# Patient Record
Sex: MALE | Race: BLACK OR AFRICAN AMERICAN | NOT HISPANIC OR LATINO | URBAN - METROPOLITAN AREA
[De-identification: names, ages, dates, MRNs, and addresses within clinical notes are randomized per-mention and may not be internally consistent; named-entity substitution may affect disease eponyms.]

---

## 2023-11-01 PROBLEM — D49.6 BRAIN TUMOR (MULTI): Status: ACTIVE | Noted: 2023-11-01

## 2023-11-01 PROBLEM — R42 VERTIGO: Status: ACTIVE | Noted: 2023-11-01

## 2023-11-01 PROBLEM — M54.50 LOWER BACK PAIN: Status: ACTIVE | Noted: 2023-11-01

## 2023-11-01 PROBLEM — G44.209 TENSION HEADACHE: Status: ACTIVE | Noted: 2023-11-01

## 2023-11-01 PROBLEM — R20.0 LEFT SIDED NUMBNESS: Status: ACTIVE | Noted: 2023-11-01

## 2023-11-01 PROBLEM — R63.4 WEIGHT LOSS: Status: ACTIVE | Noted: 2023-11-01

## 2023-11-01 RX ORDER — AMITRIPTYLINE HYDROCHLORIDE 10 MG/1
1 TABLET, FILM COATED ORAL NIGHTLY
COMMUNITY
Start: 2019-08-05

## 2023-11-01 RX ORDER — IBUPROFEN 600 MG/1
TABLET ORAL
COMMUNITY

## 2023-11-01 RX ORDER — CHOLECALCIFEROL (VITAMIN D3) 25 MCG
TABLET ORAL
COMMUNITY

## 2023-11-01 RX ORDER — VITAMIN B COMPLEX
CAPSULE ORAL
COMMUNITY

## 2023-11-01 RX ORDER — BIOTIN 10 MG
TABLET ORAL
COMMUNITY

## 2023-11-02 ENCOUNTER — OFFICE VISIT (OUTPATIENT)
Dept: PODIATRY | Facility: CLINIC | Age: 33
End: 2023-11-02
Payer: COMMERCIAL

## 2023-11-02 ENCOUNTER — APPOINTMENT (OUTPATIENT)
Dept: LAB | Facility: LAB | Age: 33
End: 2023-11-02
Payer: COMMERCIAL

## 2023-11-02 DIAGNOSIS — L60.0 INGROWN TOENAIL: ICD-10-CM

## 2023-11-02 DIAGNOSIS — D49.9 NEOPLASM: Primary | ICD-10-CM

## 2023-11-02 DIAGNOSIS — D49.9 NEOPLASM OF UNSPECIFIED BEHAVIOR OF UNSPECIFIED SITE: Primary | ICD-10-CM

## 2023-11-02 PROCEDURE — 0752T DGTZ GLS MCRSCP SLD LVL III: CPT

## 2023-11-02 PROCEDURE — 99203 OFFICE O/P NEW LOW 30 MIN: CPT | Performed by: PODIATRIST

## 2023-11-02 PROCEDURE — 20600 DRAIN/INJ JOINT/BURSA W/O US: CPT | Performed by: PODIATRIST

## 2023-11-02 PROCEDURE — 88304 TISSUE EXAM BY PATHOLOGIST: CPT

## 2023-11-02 PROCEDURE — 11750 EXCISION NAIL&NAIL MATRIX: CPT | Performed by: PODIATRIST

## 2023-11-02 RX ORDER — LIDOCAINE HYDROCHLORIDE 10 MG/ML
5 INJECTION INFILTRATION; PERINEURAL
Status: COMPLETED | OUTPATIENT
Start: 2023-11-02 | End: 2023-11-02

## 2023-11-02 RX ORDER — CEPHALEXIN 500 MG/1
500 CAPSULE ORAL 4 TIMES DAILY
Qty: 24 CAPSULE | Refills: 0 | Status: SHIPPED | OUTPATIENT
Start: 2023-11-02 | End: 2023-11-08

## 2023-11-02 RX ADMIN — TRIAMCINOLONE ACETONIDE 10 MG: 40 INJECTION, SUSPENSION INTRA-ARTICULAR; INTRAMUSCULAR at 10:38

## 2023-11-02 RX ADMIN — LIDOCAINE HYDROCHLORIDE 5 ML: 10 INJECTION INFILTRATION; PERINEURAL at 10:38

## 2023-11-02 NOTE — PROGRESS NOTES
History Of Present Illness  Evens Lucas is a 33 y.o. male presenting with chief complaint:  Transition of care    Patient complains of ingrown of the left great toe. 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    Surgical History  He has a past surgical history that includes Other surgical history (12/06/2021) and Other surgical history (05/07/2019).     Social History  He has no history on file for tobacco use, alcohol use, and drug use.    Family History  Family History   Problem Relation Name Age of Onset    No Known Problems Mother      No Known Problems Father      Other (cardiac disorder) Other g/m         Allergies  Patient has no known allergies.    Medications  Current Outpatient Medications   Medication Sig Dispense Refill    amitriptyline (Elavil) 10 mg tablet Take 1 tablet (10 mg) by mouth once daily at bedtime. Take per directed 1 tab at bedtime for one week thereafter,take 2 tablets at  bedtime daily      b complex vitamins (Vitamins B Complex) capsule Take by mouth. Take per directed      biotin 10 mg tablet Take by mouth. Take per directed      cephalexin (Keflex) 500 mg capsule Take 1 capsule (500 mg) by mouth 4 times a day for 6 days. 24 capsule 0    cholecalciferol (Vitamin D-3) 25 MCG (1000 UT) tablet Take by mouth. Take per directed      ibuprofen 600 mg tablet Take by mouth. Take per directed  prn       No current facility-administered medications for this visit.       Review of Systems    REVIEW OF SYSTEMS  GENERAL:  Negative for malaise, significant weight loss, fever  CARDIOVASCULAR: leg swelling   MUSCULOSKELETAL:  Negative for joint pain or swelling, back pain, and muscle pain.  SKIN:  Negative for lesions, rash, and itching  PSYCH:  Negative for sleep disturbance, mood disorder and recent psychosocial stressors  NEURO: Negative, denies any burning, tingling or numbness      Objective:   Vasc: DP and PT pulses are palpable bilateral.  CFT is less than 3 seconds bilateral.  Skin temperature is warm to cool proximal to distal bilateral.      Neuro:  Light touch is intact to the foot bilateral.  Protective sensation is intact to the foot when tested with the 5.07 SWM bilateral.  There is no clonus noted.  The hallux is downgoing bilateral.      Derm: Nails are normal. Skin is supple with normal texture and turgor noted.  Webspaces are clean, dry and intact bilateral.  There are no hyperkeratoses, ulcerations, verruca or other lesions noted.  Ingrown toenail left first nail, he has build up under nail fold lateral    Ortho: Muscle strength is 5/5 for all pedal groups tested.      Assessment/Plan     Diagnoses and all orders for this visit:  Neoplasm  -     Surgical Pathology Exam  -     S Inj/Asp: L great MTP  Ingrown toenail  -     cephalexin (Keflex) 500 mg capsule; Take 1 capsule (500 mg) by mouth 4 times a day for 6 days.  -     S Inj/Asp: L great MTP                   Patient ID: Evens Lucas is a 33 y.o. male.    S Inj/Asp: L great MTP on 11/2/2023 10:38 AM  Indications: pain  Details: 25 G needle, dorsal approach  Medications: 5 mL lidocaine 10 mg/mL (1 %); 10 mg triamcinolone acetonide 40 mg/mL  Outcome: tolerated well, no immediate complications    Patient has consented to a permanent partial nail avulsion both verbally and written. Patient understands that the toenail may grow back and could appear discolored, thickened, or with a fungal infection. Patient appears to understand and is in agreement with the plan.  All questions were answered to the patient's satisfaction with no guarantees given or implied.      Nail Removal Informed Consent included the following and was signed by patient or patients guardian:   I understand that this procedure involves injection of local anesthesia, and I have no known allergies to local anesthetics.  My physician and I have discussed risks,  benefits, and potential complications of this procedure.  Risks include, but are not limited to: persistent bleeding, pain, prolonged healing, infection, allergic reaction, swelling, numbness/tingling, and recurrence.  Alternative treatment options were also discussed.  All questions have been answered to my satisfaction and no guarantees regarding the outcome of the procedure have been given or implied.  Aftercare requirements have been discussed and I intend to comply with recommendation.      Procedure: Partial Temporary Nail Avulsion - Lateral Border of left first Toe    The digit was anesthetized with 3cc of 2% lidocaine plain utilizing a digit block. The area was sterily prepped and draped. The toe was cleansed with betadine. Digit tourniqauet applied. A spatula was used to separate the nail plate from the nail bed. An english anvil was used to separate the nail plate in a longitudinal fashion. The nail border was removed with hemostats. A curette was used to ensure no spicules remained.     Three separate applications of phenol were applied to the matrix cells for 30 seconds each. It was ensured that the skin was protected from the chemical. The area was rinsed thoroughly with alcohol to neutralize the acid.   There was a small cyst like tissue that was excised from prox nail fold. It was sent to pathology for evaluation   Digit tourniquet removed. A curette was used again to ensure no spicules remained.  Appropriate capillary refill time was confirmed.  The toe was then dressed with antibiotic ointment and a dry sterile dressing.  Patient was given extensive verbal and written homegoing instructions.      Homegoing instructions dispensed included:   The injection that you received prior to the procedure will have local anesthetic effects for the next 3-5 hours.  Following this, you may notice that the toe is sore with pressure.  Therefore, during healing, attempt to wear an open-toe or wider-toebox  shoe.  Leave the bandage on your toe for the next 24 hours if possible.  Tomorrow, begin soaking the affected foot in a warm water bath, with or without Epsom salts.  Make sure to dry the toe completely before applying a dressing.   Change bandage on a daily basis beginning tomorrow following soaks.  Apply a small amount of topical antibiotic ointment with each dressing change.    Keep bandages on while you shower, and change bandage once you dry off.  Keep bandages on when you are wearing shoes and socks.  If a scab forms at the nail removal site, leave it in place.  Continue with daily soaking and bandage changes until you return in two weeks for your follow up appointment.  Call your physician immediately if you notice any increased redness, warmth, milky-appearing discharge, or other signs of infection at the nail removal site.  Call your physician immediately if you experience significant bleeding at the nail removal site.    Patient was advised to call the office or emergency advice line should they experience any problems, changes or worsening of symptoms, or have any questions. Alternatively, the patient was advised to go to the ED.    Pt instructed to follow up in 2-3 weeks or sooner if necessary.     Immediately prior to procedure a time out was called to verify the correct patient, procedure, equipment, support staff and site/side marked as required. Patient was prepped and draped in the usual sterile fashion.       Denise Mora, OWEN  [address] [place] [address]

## 2023-11-02 NOTE — PATIENT INSTRUCTIONS
Instructions after nail surgery:  1.  You have just had a surgical procedure to correct a deformed or infected ingrown toenail.  2.  You will experience a little pain.  Any pain that you have easily be controlled with Tylenol or something comparable.  3.  If you see blood coming through the bandage, do not be alarmed, this is normal.  4.  The day following your procedure, remove the bandage and soak your foot in either warm water and Epsom salts or warm water and antibacterial soap.  5.  Your toe will appear infected for approximately 10 days.  It will be red and it will drain.  This is due to the chemical that was applied during the procedure..  6.  Repeat soaking for 10 days postoperatively.  After soaking, apply triple antibiotic and a Band-Aid to the toe.  7.  A follow-up appointment is not necessary if the toe heals uneventfully.  If you are worried, please call and make a follow-up appointment.

## 2023-11-06 RX ORDER — TRIAMCINOLONE ACETONIDE 40 MG/ML
10 INJECTION, SUSPENSION INTRA-ARTICULAR; INTRAMUSCULAR
Status: COMPLETED | OUTPATIENT
Start: 2023-11-02 | End: 2023-11-02

## 2023-11-13 LAB
LABORATORY COMMENT REPORT: NORMAL
PATH REPORT.FINAL DX SPEC: NORMAL
PATH REPORT.GROSS SPEC: NORMAL
PATH REPORT.RELEVANT HX SPEC: NORMAL
PATH REPORT.TOTAL CANCER: NORMAL

## 2024-01-08 NOTE — PROGRESS NOTES
General: Skin is warm and dry.      Coloration: Skin is not pale.      Findings: No erythema or rash.   Neurological:      Mental Status: He is alert and oriented to person, place, and time.      Motor: No abnormal muscle tone.   Psychiatric:         Thought Content: Thought content normal.         Judgment: Judgment normal.         Assessment:       Diagnosis Orders   1. History of benign brain tumor        2. Hyperglycemia  Comprehensive Metabolic Panel    CBC with Auto Differential    Hemoglobin A1C      3. Hyperlipidemia, unspecified hyperlipidemia type  Lipid, Fasting           No results found for: \"LIPIDPAN\", \"BMP\", \"CMP\", \"CBC\", \"CBCAUTODIF\"  Plan:      Ahmet was seen today for new patient.    Diagnoses and all orders for this visit:    History of benign brain tumor    Hyperglycemia  -     Comprehensive Metabolic Panel; Future  -     CBC with Auto Differential; Future  -     Hemoglobin A1C; Future    Hyperlipidemia, unspecified hyperlipidemia type  -     Lipid, Fasting; Future         Return in about 9 months (around 10/9/2024).      On this date 01/09/24 I have spent 30 minutes reviewing previous notes, test results and face to face with the patient discussing the diagnosis and importance of compliance with the treatment plan.     Iam Barbosa MD    Please note, this report has been partially produced using speech recognition software  and may cause  and /or contain errors related to that system including grammar, punctuation and spelling as well as words and phrases that may seem inappropriate. If there are questions or concerns please feel free to contact me to clarify.

## 2024-01-09 ENCOUNTER — OFFICE VISIT (OUTPATIENT)
Dept: FAMILY MEDICINE CLINIC | Age: 34
End: 2024-01-09
Payer: COMMERCIAL

## 2024-01-09 VITALS
RESPIRATION RATE: 16 BRPM | BODY MASS INDEX: 38.45 KG/M2 | HEART RATE: 85 BPM | WEIGHT: 245 LBS | HEIGHT: 67 IN | DIASTOLIC BLOOD PRESSURE: 80 MMHG | OXYGEN SATURATION: 96 % | SYSTOLIC BLOOD PRESSURE: 110 MMHG

## 2024-01-09 DIAGNOSIS — Z86.011 HISTORY OF BENIGN BRAIN TUMOR: Primary | ICD-10-CM

## 2024-01-09 DIAGNOSIS — E78.5 HYPERLIPIDEMIA, UNSPECIFIED HYPERLIPIDEMIA TYPE: ICD-10-CM

## 2024-01-09 DIAGNOSIS — R73.9 HYPERGLYCEMIA: ICD-10-CM

## 2024-01-09 LAB
ALBUMIN SERPL-MCNC: 4.8 G/DL (ref 3.5–4.6)
ALP SERPL-CCNC: 100 U/L (ref 35–104)
ALT SERPL-CCNC: 56 U/L (ref 0–41)
ANION GAP SERPL CALCULATED.3IONS-SCNC: 17 MEQ/L (ref 9–15)
AST SERPL-CCNC: 32 U/L (ref 0–40)
BASOPHILS # BLD: 0.1 K/UL (ref 0–0.2)
BASOPHILS NFR BLD: 1.1 %
BILIRUB SERPL-MCNC: 0.7 MG/DL (ref 0.2–0.7)
BUN SERPL-MCNC: 7 MG/DL (ref 6–20)
CALCIUM SERPL-MCNC: 9.7 MG/DL (ref 8.5–9.9)
CHLORIDE SERPL-SCNC: 103 MEQ/L (ref 95–107)
CHOLEST SERPL-MCNC: 196 MG/DL (ref 0–199)
CO2 SERPL-SCNC: 22 MEQ/L (ref 20–31)
CREAT SERPL-MCNC: 0.98 MG/DL (ref 0.7–1.2)
EOSINOPHIL # BLD: 0.1 K/UL (ref 0–0.7)
EOSINOPHIL NFR BLD: 1.5 %
ERYTHROCYTE [DISTWIDTH] IN BLOOD BY AUTOMATED COUNT: 13.6 % (ref 11.5–14.5)
GLOBULIN SER CALC-MCNC: 3.4 G/DL (ref 2.3–3.5)
GLUCOSE SERPL-MCNC: 124 MG/DL (ref 70–99)
HBA1C MFR BLD: 5.8 % (ref 4.8–5.9)
HCT VFR BLD AUTO: 45.3 % (ref 42–52)
HDLC SERPL-MCNC: 34 MG/DL (ref 40–59)
HGB BLD-MCNC: 16 G/DL (ref 14–18)
LDL CHOLESTEROL CALCULATED: 136 MG/DL (ref 0–129)
LYMPHOCYTES # BLD: 2.3 K/UL (ref 1–4.8)
LYMPHOCYTES NFR BLD: 43 %
MCH RBC QN AUTO: 29.1 PG (ref 27–31.3)
MCHC RBC AUTO-ENTMCNC: 35.3 % (ref 33–37)
MCV RBC AUTO: 82.4 FL (ref 79–92.2)
MONOCYTES # BLD: 0.3 K/UL (ref 0.2–0.8)
MONOCYTES NFR BLD: 5.6 %
NEUTROPHILS # BLD: 2.6 K/UL (ref 1.4–6.5)
NEUTS SEG NFR BLD: 48.2 %
PLATELET # BLD AUTO: 280 K/UL (ref 130–400)
POTASSIUM SERPL-SCNC: 4 MEQ/L (ref 3.4–4.9)
PROT SERPL-MCNC: 8.2 G/DL (ref 6.3–8)
RBC # BLD AUTO: 5.5 M/UL (ref 4.7–6.1)
SODIUM SERPL-SCNC: 142 MEQ/L (ref 135–144)
TRIGLYCERIDE, FASTING: 128 MG/DL (ref 0–150)
WBC # BLD AUTO: 5.3 K/UL (ref 4.8–10.8)

## 2024-01-09 PROCEDURE — 99214 OFFICE O/P EST MOD 30 MIN: CPT | Performed by: INTERNAL MEDICINE

## 2024-01-09 PROCEDURE — G8427 DOCREV CUR MEDS BY ELIG CLIN: HCPCS | Performed by: INTERNAL MEDICINE

## 2024-01-09 PROCEDURE — G8484 FLU IMMUNIZE NO ADMIN: HCPCS | Performed by: INTERNAL MEDICINE

## 2024-01-09 PROCEDURE — 4004F PT TOBACCO SCREEN RCVD TLK: CPT | Performed by: INTERNAL MEDICINE

## 2024-01-09 PROCEDURE — G8417 CALC BMI ABV UP PARAM F/U: HCPCS | Performed by: INTERNAL MEDICINE

## 2024-01-09 SDOH — ECONOMIC STABILITY: HOUSING INSECURITY
IN THE LAST 12 MONTHS, WAS THERE A TIME WHEN YOU DID NOT HAVE A STEADY PLACE TO SLEEP OR SLEPT IN A SHELTER (INCLUDING NOW)?: NO

## 2024-01-09 SDOH — ECONOMIC STABILITY: FOOD INSECURITY: WITHIN THE PAST 12 MONTHS, YOU WORRIED THAT YOUR FOOD WOULD RUN OUT BEFORE YOU GOT MONEY TO BUY MORE.: NEVER TRUE

## 2024-01-09 SDOH — ECONOMIC STABILITY: FOOD INSECURITY: WITHIN THE PAST 12 MONTHS, THE FOOD YOU BOUGHT JUST DIDN'T LAST AND YOU DIDN'T HAVE MONEY TO GET MORE.: NEVER TRUE

## 2024-01-09 SDOH — ECONOMIC STABILITY: INCOME INSECURITY: HOW HARD IS IT FOR YOU TO PAY FOR THE VERY BASICS LIKE FOOD, HOUSING, MEDICAL CARE, AND HEATING?: NOT HARD AT ALL

## 2024-01-09 ASSESSMENT — ENCOUNTER SYMPTOMS
FACIAL SWELLING: 0
COLOR CHANGE: 0
BACK PAIN: 0
ABDOMINAL DISTENTION: 0
EYE DISCHARGE: 0
APNEA: 0
EYE REDNESS: 0
SINUS PAIN: 0
RHINORRHEA: 0
DIARRHEA: 0
ABDOMINAL PAIN: 0
COUGH: 0
NAUSEA: 0
RECTAL PAIN: 0
TROUBLE SWALLOWING: 0
SORE THROAT: 0
WHEEZING: 0
SINUS PRESSURE: 0
SHORTNESS OF BREATH: 0
EYE PAIN: 0
PHOTOPHOBIA: 0
EYE ITCHING: 0
CONSTIPATION: 0
BLOOD IN STOOL: 0
VOMITING: 0
CHEST TIGHTNESS: 0
VOICE CHANGE: 0

## 2024-01-09 ASSESSMENT — PATIENT HEALTH QUESTIONNAIRE - PHQ9
SUM OF ALL RESPONSES TO PHQ QUESTIONS 1-9: 0
SUM OF ALL RESPONSES TO PHQ QUESTIONS 1-9: 0
2. FEELING DOWN, DEPRESSED OR HOPELESS: 0
1. LITTLE INTEREST OR PLEASURE IN DOING THINGS: 0
SUM OF ALL RESPONSES TO PHQ QUESTIONS 1-9: 0
SUM OF ALL RESPONSES TO PHQ9 QUESTIONS 1 & 2: 0
SUM OF ALL RESPONSES TO PHQ QUESTIONS 1-9: 0

## 2024-10-08 ASSESSMENT — ENCOUNTER SYMPTOMS
EYE DISCHARGE: 0
CONSTIPATION: 0
APNEA: 0
EYE PAIN: 0
SINUS PAIN: 0
EYE REDNESS: 0
SINUS PRESSURE: 0
CHEST TIGHTNESS: 0
RHINORRHEA: 0
WHEEZING: 0
PHOTOPHOBIA: 0
RECTAL PAIN: 0
BLOOD IN STOOL: 0
ABDOMINAL DISTENTION: 0
SORE THROAT: 0
VOICE CHANGE: 0
COLOR CHANGE: 0
ABDOMINAL PAIN: 0
BACK PAIN: 0
EYE ITCHING: 0
COUGH: 0
TROUBLE SWALLOWING: 0
DIARRHEA: 0
FACIAL SWELLING: 0
VOMITING: 0
NAUSEA: 0
SHORTNESS OF BREATH: 0

## 2024-10-08 NOTE — PROGRESS NOTES
Subjective:      Patient ID: Ahmet Savage is a 34 y.o. male New patient, here for evaluation of the following chief complaint(s):  Chief Complaint   Patient presents with    Annual Exam       HPI  33-year-old male with a history of hyperglycemia , hyperlipidemia and  benign brain tumor presents for a fu visit.        Hyperlipidemia: The patient's most recent cholesterol profile is noted below:      Component      Latest Ref Rng 1/9/2024   Cholesterol, Fasting      0 - 199 mg/dL 196    Triglyceride, Fasting      0 - 150 mg/dL 128    HDL Cholesterol      40 - 59 mg/dL 34 (L)    LDL Cholesterol      0 - 129 mg/dL 136 (H)           Hyperglycemia: An assessment the patient's basic metabolic panel on March 25, 2019 revealed a glucose of 120.      Shx:  T:+ cigars  A:+ social   D:-A1c  Role Model : Grandma:Make positive impact and pursue your dream as life is short. Love one another.           Fhx:   4 daughter:   Rito-12 yr old  Tiffany-3 years old  Liv-15 months  Willow \"Ingris\"-3 weeks            MGM:DMII  PGM:lung CA  Mother: Healthy  Father:Healty  Sister:  Brother        Brain Surgery: in 2000    At present he denies polyuria,  Polydipsia, constitutional, sinus, visual, cardiopulmonary, urologic, gastrointestinal, immunologic/hematologic, musculoskeletal, neurologic,dermatologic, or psychiatric complaints.    Current Outpatient Medications on File Prior to Visit   Medication Sig Dispense Refill    SUMAtriptan (IMITREX) 50 MG tablet Take 1 tablet by mouth 2 times daily as needed for Migraine for up to 10 doses. 20 tablet 0     No current facility-administered medications on file prior to visit.      Patient has no known allergies.  Past Medical History:   Diagnosis Date    History of benign brain tumor      Past Surgical History:   Procedure Laterality Date    BRAIN SURGERY       Social History     Socioeconomic History    Marital status: Single     Spouse name: Not on file    Number of children: Not on file    Years of

## 2024-10-09 ENCOUNTER — OFFICE VISIT (OUTPATIENT)
Dept: FAMILY MEDICINE CLINIC | Age: 34
End: 2024-10-09
Payer: COMMERCIAL

## 2024-10-09 VITALS
RESPIRATION RATE: 16 BRPM | SYSTOLIC BLOOD PRESSURE: 110 MMHG | WEIGHT: 263 LBS | HEIGHT: 67 IN | OXYGEN SATURATION: 96 % | DIASTOLIC BLOOD PRESSURE: 76 MMHG | BODY MASS INDEX: 41.28 KG/M2 | HEART RATE: 83 BPM

## 2024-10-09 DIAGNOSIS — E78.5 HYPERLIPIDEMIA, UNSPECIFIED HYPERLIPIDEMIA TYPE: ICD-10-CM

## 2024-10-09 DIAGNOSIS — R73.9 HYPERGLYCEMIA: Primary | ICD-10-CM

## 2024-10-09 PROCEDURE — G8427 DOCREV CUR MEDS BY ELIG CLIN: HCPCS | Performed by: INTERNAL MEDICINE

## 2024-10-09 PROCEDURE — 99214 OFFICE O/P EST MOD 30 MIN: CPT | Performed by: INTERNAL MEDICINE

## 2024-10-09 PROCEDURE — 4004F PT TOBACCO SCREEN RCVD TLK: CPT | Performed by: INTERNAL MEDICINE

## 2024-10-09 PROCEDURE — G8417 CALC BMI ABV UP PARAM F/U: HCPCS | Performed by: INTERNAL MEDICINE

## 2024-10-09 PROCEDURE — G8484 FLU IMMUNIZE NO ADMIN: HCPCS | Performed by: INTERNAL MEDICINE

## 2025-01-13 ENCOUNTER — OFFICE VISIT (OUTPATIENT)
Age: 35
End: 2025-01-13
Payer: COMMERCIAL

## 2025-01-13 VITALS
BODY MASS INDEX: 41.28 KG/M2 | DIASTOLIC BLOOD PRESSURE: 80 MMHG | OXYGEN SATURATION: 95 % | WEIGHT: 263 LBS | RESPIRATION RATE: 16 BRPM | HEART RATE: 85 BPM | SYSTOLIC BLOOD PRESSURE: 112 MMHG | HEIGHT: 67 IN

## 2025-01-13 DIAGNOSIS — E78.5 HYPERLIPIDEMIA, UNSPECIFIED HYPERLIPIDEMIA TYPE: ICD-10-CM

## 2025-01-13 DIAGNOSIS — R73.9 HYPERGLYCEMIA: ICD-10-CM

## 2025-01-13 DIAGNOSIS — Z86.011 HISTORY OF BENIGN BRAIN TUMOR: ICD-10-CM

## 2025-01-13 DIAGNOSIS — R73.9 HYPERGLYCEMIA: Primary | ICD-10-CM

## 2025-01-13 LAB
ALBUMIN SERPL-MCNC: 4.6 G/DL (ref 3.5–4.6)
ALP SERPL-CCNC: 111 U/L (ref 35–104)
ALT SERPL-CCNC: 75 U/L (ref 0–41)
ANION GAP SERPL CALCULATED.3IONS-SCNC: 11 MEQ/L (ref 9–15)
AST SERPL-CCNC: 38 U/L (ref 0–40)
BASOPHILS # BLD: 0.1 K/UL (ref 0–0.2)
BASOPHILS NFR BLD: 1 %
BILIRUB SERPL-MCNC: 0.5 MG/DL (ref 0.2–0.7)
BUN SERPL-MCNC: 7 MG/DL (ref 6–20)
CALCIUM SERPL-MCNC: 9.6 MG/DL (ref 8.5–9.9)
CHLORIDE SERPL-SCNC: 102 MEQ/L (ref 95–107)
CHOLEST SERPL-MCNC: 178 MG/DL (ref 0–199)
CO2 SERPL-SCNC: 29 MEQ/L (ref 20–31)
CREAT SERPL-MCNC: 0.97 MG/DL (ref 0.7–1.2)
EOSINOPHIL # BLD: 0.1 K/UL (ref 0–0.7)
EOSINOPHIL NFR BLD: 1.6 %
ERYTHROCYTE [DISTWIDTH] IN BLOOD BY AUTOMATED COUNT: 13.1 % (ref 11.5–14.5)
GLOBULIN SER CALC-MCNC: 2.9 G/DL (ref 2.3–3.5)
GLUCOSE SERPL-MCNC: 124 MG/DL (ref 70–99)
HCT VFR BLD AUTO: 48.7 % (ref 42–52)
HDLC SERPL-MCNC: 27 MG/DL (ref 40–59)
HGB BLD-MCNC: 17 G/DL (ref 14–18)
LDL CHOLESTEROL: 90 MG/DL (ref 0–129)
LYMPHOCYTES # BLD: 3 K/UL (ref 1–4.8)
LYMPHOCYTES NFR BLD: 47.9 %
MCH RBC QN AUTO: 29.3 PG (ref 27–31.3)
MCHC RBC AUTO-ENTMCNC: 34.9 % (ref 33–37)
MCV RBC AUTO: 84 FL (ref 79–92.2)
MONOCYTES # BLD: 0.4 K/UL (ref 0.2–0.8)
MONOCYTES NFR BLD: 6.3 %
NEUTROPHILS # BLD: 2.6 K/UL (ref 1.4–6.5)
NEUTS SEG NFR BLD: 41.8 %
PLATELET # BLD AUTO: 301 K/UL (ref 130–400)
POTASSIUM SERPL-SCNC: 4.2 MEQ/L (ref 3.4–4.9)
PROT SERPL-MCNC: 7.5 G/DL (ref 6.3–8)
RBC # BLD AUTO: 5.8 M/UL (ref 4.7–6.1)
SODIUM SERPL-SCNC: 142 MEQ/L (ref 135–144)
TRIGLYCERIDE, FASTING: 305 MG/DL (ref 0–150)
WBC # BLD AUTO: 6.3 K/UL (ref 4.8–10.8)

## 2025-01-13 PROCEDURE — G8417 CALC BMI ABV UP PARAM F/U: HCPCS | Performed by: INTERNAL MEDICINE

## 2025-01-13 PROCEDURE — 99213 OFFICE O/P EST LOW 20 MIN: CPT | Performed by: INTERNAL MEDICINE

## 2025-01-13 PROCEDURE — 99214 OFFICE O/P EST MOD 30 MIN: CPT | Performed by: INTERNAL MEDICINE

## 2025-01-13 PROCEDURE — G8428 CUR MEDS NOT DOCUMENT: HCPCS | Performed by: INTERNAL MEDICINE

## 2025-01-13 PROCEDURE — 4004F PT TOBACCO SCREEN RCVD TLK: CPT | Performed by: INTERNAL MEDICINE

## 2025-01-13 SDOH — ECONOMIC STABILITY: FOOD INSECURITY: WITHIN THE PAST 12 MONTHS, YOU WORRIED THAT YOUR FOOD WOULD RUN OUT BEFORE YOU GOT MONEY TO BUY MORE.: NEVER TRUE

## 2025-01-13 SDOH — ECONOMIC STABILITY: FOOD INSECURITY: WITHIN THE PAST 12 MONTHS, THE FOOD YOU BOUGHT JUST DIDN'T LAST AND YOU DIDN'T HAVE MONEY TO GET MORE.: NEVER TRUE

## 2025-01-13 ASSESSMENT — ENCOUNTER SYMPTOMS
BACK PAIN: 0
SHORTNESS OF BREATH: 0
FACIAL SWELLING: 0
PHOTOPHOBIA: 0
NAUSEA: 0
BLOOD IN STOOL: 0
EYE ITCHING: 0
SINUS PRESSURE: 0
SORE THROAT: 0
EYE REDNESS: 0
EYE PAIN: 0
VOMITING: 0
ABDOMINAL PAIN: 0
RECTAL PAIN: 0
EYE DISCHARGE: 0
ABDOMINAL DISTENTION: 0
APNEA: 0
CONSTIPATION: 0
COLOR CHANGE: 0
VOICE CHANGE: 0
DIARRHEA: 0
COUGH: 0
WHEEZING: 0
RHINORRHEA: 0
TROUBLE SWALLOWING: 0
SINUS PAIN: 0
CHEST TIGHTNESS: 0

## 2025-01-13 ASSESSMENT — PATIENT HEALTH QUESTIONNAIRE - PHQ9
SUM OF ALL RESPONSES TO PHQ QUESTIONS 1-9: 0
1. LITTLE INTEREST OR PLEASURE IN DOING THINGS: NOT AT ALL
SUM OF ALL RESPONSES TO PHQ9 QUESTIONS 1 & 2: 0
SUM OF ALL RESPONSES TO PHQ QUESTIONS 1-9: 0
2. FEELING DOWN, DEPRESSED OR HOPELESS: NOT AT ALL
SUM OF ALL RESPONSES TO PHQ QUESTIONS 1-9: 0
SUM OF ALL RESPONSES TO PHQ QUESTIONS 1-9: 0

## 2025-01-13 NOTE — PROGRESS NOTES
Subjective:      Patient ID: Ahmet Savage is a 34 y.o. male New patient, here for evaluation of the following chief complaint(s):  Chief Complaint   Patient presents with    Follow-up    Cough     Pt states he has had a cough for 2 weeks and was seen at an urgent care       HPI  33-year-old male with a history of hyperglycemia , hyperlipidemia and  benign brain tumor presents for a fu visit.        Hyperlipidemia: The patient's most recent cholesterol profile is noted below:      Component      Latest Ref Rng 1/9/2024   Cholesterol, Fasting      0 - 199 mg/dL 196    Triglyceride, Fasting      0 - 150 mg/dL 128    HDL Cholesterol      40 - 59 mg/dL 34 (L)    LDL Cholesterol      0 - 129 mg/dL 136 (H)           Hyperglycemia: An assessment the patient's basic metabolic panel on March 25, 2019 revealed a glucose of 120.      Shx:  T:+ cigars  A:+ social   D:-A1c  Role Model : Grandma:Make positive impact and pursue your dream as life is short. Love one another.           Fhx:   4 daughter:   Rito-12 yr old  Tiffany-3 years old  Liv-15 months  Willow \"Ingris\"-3 weeks            MGM:DMII  PGM:lung CA  Mother: Healthy  Father:Healty  Sister:  Brother        Brain Surgery: in 2000    At present he denies polyuria,  Polydipsia, constitutional, sinus, visual, cardiopulmonary, urologic, gastrointestinal, immunologic/hematologic, musculoskeletal, neurologic,dermatologic, or psychiatric complaints.    Current Outpatient Medications on File Prior to Visit   Medication Sig Dispense Refill    SUMAtriptan (IMITREX) 50 MG tablet Take 1 tablet by mouth 2 times daily as needed for Migraine for up to 10 doses. 20 tablet 0     No current facility-administered medications on file prior to visit.      Patient has no known allergies.  Past Medical History:   Diagnosis Date    History of benign brain tumor      Past Surgical History:   Procedure Laterality Date    BRAIN SURGERY       Social History     Socioeconomic History    Marital status:

## 2025-01-14 LAB
ESTIMATED AVERAGE GLUCOSE: 114 MG/DL
HBA1C MFR BLD: 5.6 % (ref 4–6)

## 2025-04-15 ENCOUNTER — OFFICE VISIT (OUTPATIENT)
Age: 35
End: 2025-04-15
Payer: COMMERCIAL

## 2025-04-15 VITALS
DIASTOLIC BLOOD PRESSURE: 80 MMHG | BODY MASS INDEX: 42.22 KG/M2 | SYSTOLIC BLOOD PRESSURE: 120 MMHG | HEART RATE: 92 BPM | RESPIRATION RATE: 16 BRPM | WEIGHT: 269 LBS | HEIGHT: 67 IN | OXYGEN SATURATION: 97 %

## 2025-04-15 DIAGNOSIS — E66.9 OBESITY WITH SERIOUS COMORBIDITY, UNSPECIFIED CLASS, UNSPECIFIED OBESITY TYPE: ICD-10-CM

## 2025-04-15 DIAGNOSIS — E78.5 HYPERLIPIDEMIA, UNSPECIFIED HYPERLIPIDEMIA TYPE: Primary | ICD-10-CM

## 2025-04-15 PROCEDURE — G8417 CALC BMI ABV UP PARAM F/U: HCPCS | Performed by: INTERNAL MEDICINE

## 2025-04-15 PROCEDURE — 99212 OFFICE O/P EST SF 10 MIN: CPT | Performed by: INTERNAL MEDICINE

## 2025-04-15 PROCEDURE — 99213 OFFICE O/P EST LOW 20 MIN: CPT | Performed by: INTERNAL MEDICINE

## 2025-04-15 PROCEDURE — 4004F PT TOBACCO SCREEN RCVD TLK: CPT | Performed by: INTERNAL MEDICINE

## 2025-04-15 PROCEDURE — G8427 DOCREV CUR MEDS BY ELIG CLIN: HCPCS | Performed by: INTERNAL MEDICINE

## 2025-04-15 RX ORDER — PHENTERMINE HYDROCHLORIDE 37.5 MG/1
37.5 TABLET ORAL
Qty: 30 TABLET | Refills: 0 | Status: SHIPPED | OUTPATIENT
Start: 2025-04-15 | End: 2025-05-15

## 2025-04-15 ASSESSMENT — ENCOUNTER SYMPTOMS
CHEST TIGHTNESS: 0
EYE ITCHING: 0
ABDOMINAL DISTENTION: 0
FACIAL SWELLING: 0
VOICE CHANGE: 0
EYE REDNESS: 0
EYE PAIN: 0
BACK PAIN: 0
APNEA: 0
BLOOD IN STOOL: 0
COUGH: 0
SINUS PRESSURE: 0
RHINORRHEA: 0
TROUBLE SWALLOWING: 0
DIARRHEA: 0
CONSTIPATION: 0
SORE THROAT: 0
WHEEZING: 0
PHOTOPHOBIA: 0
EYE DISCHARGE: 0
COLOR CHANGE: 0
ABDOMINAL PAIN: 0
SHORTNESS OF BREATH: 0
VOMITING: 0
SINUS PAIN: 0
NAUSEA: 0
RECTAL PAIN: 0

## 2025-04-15 NOTE — PROGRESS NOTES
Subjective:      Patient ID: Ahmet Savage is a 34 y.o. male New patient, here for evaluation of the following chief complaint(s):  No chief complaint on file.      HPI  33-year-old male with a history of hyperglycemia , hyperlipidemia and  benign brain tumor presents for a fu visit.        Hyperlipidemia: The patient's most recent cholesterol profile is noted below:      Component      Latest Ref Rng 1/9/2024   Cholesterol, Fasting      0 - 199 mg/dL 196    Triglyceride, Fasting      0 - 150 mg/dL 128    HDL Cholesterol      40 - 59 mg/dL 34 (L)    LDL Cholesterol      0 - 129 mg/dL 136 (H)         Obesity: The patient's current body mass index is 42.13.  He would like to receive pharmacologic therapy to assist him in achieving weight loss goals.    Shx:  T:+ cigars  A:+ social   D:-  Role Model : Grandma:Make positive impact and pursue your dream as life is short. Love one another.           Fhx:   4 daughter:   Rito-12 yr old  Tiffany-3 years old  Liv-15 months  Willow \"Ingris\"-3 weeks            MGM:DMII  PGM:lung CA  Mother: Healthy  Father:Healty  Sister:  Brother        Brain Surgery: in 2000    At present he denies polyuria,  Polydipsia, constitutional, sinus, visual, cardiopulmonary, urologic, gastrointestinal, immunologic/hematologic, musculoskeletal, neurologic,dermatologic, or psychiatric complaints.    No current outpatient medications on file prior to visit.     No current facility-administered medications on file prior to visit.      Patient has no known allergies.  Past Medical History:   Diagnosis Date    History of benign brain tumor      Past Surgical History:   Procedure Laterality Date    BRAIN SURGERY       Social History     Socioeconomic History    Marital status: Single     Spouse name: Not on file    Number of children: Not on file    Years of education: Not on file    Highest education level: Not on file   Occupational History    Not on file   Tobacco Use    Smoking status: Every Day     Types: